# Patient Record
Sex: FEMALE | Race: WHITE | Employment: UNEMPLOYED | URBAN - METROPOLITAN AREA
[De-identification: names, ages, dates, MRNs, and addresses within clinical notes are randomized per-mention and may not be internally consistent; named-entity substitution may affect disease eponyms.]

---

## 2020-02-28 ENCOUNTER — HOSPITAL ENCOUNTER (OUTPATIENT)
Dept: GENERAL RADIOLOGY | Age: 65
Discharge: HOME | End: 2020-02-28
Payer: MEDICARE

## 2020-02-28 DIAGNOSIS — M19.90 DJD (DEGENERATIVE JOINT DISEASE): ICD-10-CM

## 2020-02-28 PROCEDURE — 73502 X-RAY EXAM HIP UNI 2-3 VIEWS: CPT

## 2020-09-01 ENCOUNTER — ANESTHESIA EVENT (OUTPATIENT)
Dept: ENDOSCOPY | Age: 65
End: 2020-09-01
Payer: MEDICARE

## 2020-09-01 ENCOUNTER — HOSPITAL ENCOUNTER (OUTPATIENT)
Dept: SURGERY | Age: 65
Discharge: HOME | End: 2020-09-01

## 2020-09-01 VITALS — WEIGHT: 189 LBS | HEIGHT: 63 IN | BODY MASS INDEX: 33.49 KG/M2

## 2020-09-01 RX ORDER — POTASSIUM CHLORIDE 600 MG/1
8 TABLET, FILM COATED, EXTENDED RELEASE ORAL 2 TIMES DAILY
COMMUNITY

## 2020-09-01 RX ORDER — UMECLIDINIUM 62.5 UG/1
1 AEROSOL, POWDER ORAL
COMMUNITY

## 2020-09-01 RX ORDER — ONDANSETRON HYDROCHLORIDE 8 MG/1
8 TABLET, FILM COATED ORAL
COMMUNITY

## 2020-09-01 RX ORDER — ALBUTEROL SULFATE 90 UG/1
AEROSOL, METERED RESPIRATORY (INHALATION) AS NEEDED
COMMUNITY

## 2020-09-01 RX ORDER — ZINC GLUCONATE 50 MG
1 TABLET ORAL EVERY EVENING
COMMUNITY

## 2020-09-01 RX ORDER — FUROSEMIDE 20 MG/1
20 TABLET ORAL DAILY
COMMUNITY

## 2020-09-01 RX ORDER — MONTELUKAST SODIUM 10 MG/1
10 TABLET ORAL EVERY EVENING
COMMUNITY

## 2020-09-01 RX ORDER — LISINOPRIL 10 MG/1
10 TABLET ORAL DAILY
COMMUNITY

## 2020-09-01 RX ORDER — ATORVASTATIN CALCIUM 20 MG/1
20 TABLET, FILM COATED ORAL EVERY EVENING
COMMUNITY

## 2020-09-01 RX ORDER — CARVEDILOL 6.25 MG/1
6.25 TABLET ORAL 2 TIMES DAILY WITH MEALS
COMMUNITY

## 2020-09-01 NOTE — PERIOP NOTES
Patient verified name, , and procedure. Type: 1a; abbreviated assessment per anesthesia guidelines  Labs per surgeon: none  Labs per anesthesia: none    A negative Covid swab result is required to proceed with surgery; appointments are made by the surgeon office and test should be collected 7 days prior to surgery. The testing center is located at the . [address], [place]. Instructed pt that they will be notified by the doctor office for time of arrival to GI lab. Pt states she just received call from Dr. Fausto Davalos office with all her instructions including med instructions. Pt frustrated that she needs to go though hx again but this RN informed pt current hx and med list in EMR not updated since  and current information needed for anesthesia day of procedure. Follow diet and prep instructions per office. Bath or shower the night before and the am of surgery with antibacterial soap. No lotions, oils, powders, cologne on skin. No make up, eye make up or jewelry. Wear loose fitting comfortable, clean clothing. Must have adult present in building the entire time . Medications for the day of procedure - coreg and bring ProAir inhaler to hospital.    The following discharge instructions reviewed with patient: medication given during procedure may cause drowsiness for several hours, therefore, do not drive or operate machinery for remainder of the day, no alcohol on the day of your procedure, resume regular diet and activity unless otherwise directed, for mild sore throat you may use Cepacol throat lozenges or warm salt water gargles as needed, call your physician for any problems or questions. Patient verbalizes understanding.

## 2020-09-02 ENCOUNTER — ANESTHESIA (OUTPATIENT)
Dept: ENDOSCOPY | Age: 65
End: 2020-09-02
Payer: MEDICARE

## 2020-09-02 ENCOUNTER — HOSPITAL ENCOUNTER (OUTPATIENT)
Age: 65
Setting detail: OUTPATIENT SURGERY
Discharge: HOME | End: 2020-09-02
Attending: INTERNAL MEDICINE | Admitting: INTERNAL MEDICINE
Payer: MEDICARE

## 2020-09-02 VITALS
SYSTOLIC BLOOD PRESSURE: 135 MMHG | RESPIRATION RATE: 14 BRPM | OXYGEN SATURATION: 97 % | DIASTOLIC BLOOD PRESSURE: 59 MMHG | TEMPERATURE: 98.6 F | BODY MASS INDEX: 33.49 KG/M2 | HEART RATE: 86 BPM | WEIGHT: 189 LBS | HEIGHT: 63 IN

## 2020-09-02 LAB — GLUCOSE BLD STRIP.AUTO-MCNC: 121 MG/DL (ref 65–100)

## 2020-09-02 PROCEDURE — 88312 SPECIAL STAINS GROUP 1: CPT

## 2020-09-02 PROCEDURE — 74011250636 HC RX REV CODE- 250/636: Performed by: NURSE ANESTHETIST, CERTIFIED REGISTERED

## 2020-09-02 PROCEDURE — 88305 TISSUE EXAM BY PATHOLOGIST: CPT

## 2020-09-02 PROCEDURE — 76040000025: Performed by: INTERNAL MEDICINE

## 2020-09-02 PROCEDURE — 82962 GLUCOSE BLOOD TEST: CPT

## 2020-09-02 PROCEDURE — 77030040687 HC BRSH RX CYTO ERCP USEN -C: Performed by: INTERNAL MEDICINE

## 2020-09-02 PROCEDURE — 76060000031 HC ANESTHESIA FIRST 0.5 HR: Performed by: INTERNAL MEDICINE

## 2020-09-02 PROCEDURE — 88104 CYTOPATH FL NONGYN SMEARS: CPT

## 2020-09-02 PROCEDURE — 87635 SARS-COV-2 COVID-19 AMP PRB: CPT

## 2020-09-02 PROCEDURE — 77030021593 HC FCPS BIOP ENDOSC BSC -A: Performed by: INTERNAL MEDICINE

## 2020-09-02 PROCEDURE — 74011250636 HC RX REV CODE- 250/636: Performed by: ANESTHESIOLOGY

## 2020-09-02 RX ORDER — PROPOFOL 10 MG/ML
INJECTION, EMULSION INTRAVENOUS AS NEEDED
Status: DISCONTINUED | OUTPATIENT
Start: 2020-09-02 | End: 2020-09-02 | Stop reason: HOSPADM

## 2020-09-02 RX ORDER — SODIUM CHLORIDE, SODIUM LACTATE, POTASSIUM CHLORIDE, CALCIUM CHLORIDE 600; 310; 30; 20 MG/100ML; MG/100ML; MG/100ML; MG/100ML
50 INJECTION, SOLUTION INTRAVENOUS CONTINUOUS
Status: DISCONTINUED | OUTPATIENT
Start: 2020-09-02 | End: 2020-09-02 | Stop reason: HOSPADM

## 2020-09-02 RX ADMIN — PROPOFOL 50 MG: 10 INJECTION, EMULSION INTRAVENOUS at 12:29

## 2020-09-02 RX ADMIN — SODIUM CHLORIDE, SODIUM LACTATE, POTASSIUM CHLORIDE, AND CALCIUM CHLORIDE: 600; 310; 30; 20 INJECTION, SOLUTION INTRAVENOUS at 11:45

## 2020-09-02 RX ADMIN — PROPOFOL 100 MG: 10 INJECTION, EMULSION INTRAVENOUS at 12:28

## 2020-09-02 RX ADMIN — PROPOFOL 100 MG: 10 INJECTION, EMULSION INTRAVENOUS at 12:30

## 2020-09-02 RX ADMIN — SODIUM CHLORIDE, SODIUM LACTATE, POTASSIUM CHLORIDE, AND CALCIUM CHLORIDE 50 ML/HR: 600; 310; 30; 20 INJECTION, SOLUTION INTRAVENOUS at 11:13

## 2020-09-02 NOTE — OP NOTES
Esophagogastroduodenoscopy    DATE of PROCEDURE: 9/2/2020    INDICATION:  Nausea/ Vomiting    POSTPROCEDURE DIAGNOSIS: Esophageal Candida, Gastritis    MEDICATIONS ADMINISTERED: MAC anesthesia (see anesthesia report)    INSTRUMENT: [id number]    PROCEDURE:  After obtaining informed consent, the patient was placed in the left lateral position and sedated. The endoscope was advanced under direct vision without difficulty. The esophagus, stomach (including retroflexed views) and duodenum were evaluated. The patient was taken to the recovery area in stable condition. FINDINGS:  ESOPHAGUS: Candida plaques, brushed  STOMACH: Mild gastritis, Biopsied  DUODENUM: normal    Estimated blood loss: 0-minimal   Specimens obtained during procedure: 1. Gastric, 2.  Esophageal brushings    PLAN:Follow pathology, Myceleally ruiz x 7d

## 2020-09-02 NOTE — H&P
[facility] H&P (Admission)        Date:  9/2/2020    Primary GI Physician: Sara    Chief Complaint: Nausea/ vomiting    Subjective:     History of Present Illness:  Patient is a 63 y.o. female with PMH of RA, TIA on Xerelto, who is being admitted for elective endoscopy. Last Soniya Mccray was 2 days ago    PMH:  Past Medical History:   Diagnosis Date    Arthritis     RA    Chronic kidney disease 2/2013    \"almost shut down due to methotrexate & celebrex\" (dc'd celebrex)    Coagulation disorder ([address]     Diabetes ([address] not since 2013    hx of - lost 100#  2/2010 to 2013- off med since 2/2013; A1C checked regularly - last A1c 6.? per pt    Factor V Leiden ([address]     GERD (gastroesophageal reflux disease)     Pb filter in place     placed 1986    Hx-TIA (transient ischemic attack) 1998    Hypertension     controlled with med    Ill-defined condition 2011    25-30% (B) carotid art blocked (pt does not know why she has started this)- has had recheck 2014- OK per PCP    Liver disease 2013    \"toxic from Methotrexate\"- did not take folic acid    Mild aortic stenosis echo 7/17/19    Left ventricle systolic function is normal. EF 60-65%.  Rheumatoid arthritis(714.0)     S/P cataract surgery     R 12/5/13 adn L 12/12/13 Acrysof lens implant    SOB (shortness of breath) on exertion     with 1 flight of steps- 40 yrs smoking- no dx of COPD    [facility]romboembol[facility] 1985    LLE \"from ankle to hip\"       PSH:  Past Surgical History:   Procedure Laterality Date    HX APPENDECTOMY      HX CHOLECYSTECTOMY      HX COLONOSCOPY      HX GYN      PRISCILLA/ BSO- LVH    HX HEENT      CE  R&L    HX OTHER SURGICAL      skin graft L hip    NEUROLOGICAL PROCEDURE UNLISTED  2009    L5 synovial cyst/ phong/screws- later Jan 2010 another lumbar    NEUROLOGICAL PROCEDURE UNLISTED      ZULMA X 7       Allergies:   Allergies   Allergen Reactions    Celebrex [Celecoxib] Other (comments)     Caused renal failure    Codeine Other (comments)     Severe HA    Penicillins Unknown (comments)     \"They overdosed me as a child\"  \"I almost \"       Home Medications:  Prior to Admission medications    Medication Sig Start Date End Date Taking? Authorizing Provider   carvediloL (COREG) 6.25 mg tablet Take 6.25 mg by mouth two (2) times daily (with meals). Take / use AM day of surgery  per anesthesia protocols. Yes Provider, Historical   potassium chloride SR (KLOR-CON 8) 8 mEq tablet Take 8 mEq by mouth two (2) times a day. Yes Provider, Historical   Magnesium Oxide 500 mg cap Take 1 Cap by mouth every evening. Yes Provider, Historical   furosemide (Lasix) 20 mg tablet Take 20 mg by mouth daily. Yes Provider, Historical   lisinopriL (PRINIVIL, ZESTRIL) 10 mg tablet Take 10 mg by mouth daily. Yes Provider, Historical   atorvastatin (LIPITOR) 20 mg tablet Take 20 mg by mouth every evening. Yes Provider, Historical   calcium carbonate/multivitamin (MULTIVITAMIN-CALCIUM CARB PO) Take  by mouth daily. Yes Provider, Historical   ondansetron hcl (Zofran) 8 mg tablet Take 8 mg by mouth every eight (8) hours as needed for Nausea or Vomiting. Yes Provider, Historical   umeclidinium (Incruse Ellipta) 62.5 mcg/actuation inhaler Take 1 Puff by inhalation nightly. Yes Provider, Historical   CALCIUM-VITAMIN D3 PO Take 1 Tab by mouth every evening. Yes Provider, Historical   montelukast (Singulair) 10 mg tablet Take 10 mg by mouth every evening. Yes Provider, Historical   folic acid (FOLVITE) 1 mg tablet Take 1 mg by mouth two (2) times a day. While on methotrexate for RA   Indications: inadequate folic acid   Yes Provider, Historical   methotrexate sodium 2.5 mg DsPk Take  by mouth. 6 tabs every  evening and 2 tabs every Monday evening  Indications: rheumatoid arthritis, pm   Yes Provider, Historical   omega-3 fatty acids-vitamin e (FISH OIL) 1,000 mg cap Take 1 Cap by mouth daily.    Yes Provider, Historical oxyCODONE-acetaminophen (PERCOCET 10)  mg per tablet Take 1 Tab by mouth every six (6) hours as needed for Pain. Can have 2 tabs TID prn   Indications: pain   Yes Provider, Historical   Omeprazole delayed release (PRILOSEC D/R) 20 mg tablet Take 20 mg by mouth nightly. Indications: HEARTBURN   Yes Provider, Historical   rivaroxaban (Xarelto) 20 mg tab tablet Take 20 mg by mouth every evening. Provider, Historical   albuterol (ProAir HFA) 90 mcg/actuation inhaler Take  by inhalation as needed for Wheezing. Provider, Historical       Hospital Medications:  Current Facility-Administered Medications   Medication Dose Route Frequency    lactated Ringers infusion  50 mL/hr IntraVENous CONTINUOUS       Social History:  Social History     Tobacco Use    Smoking status: Current Every Day Smoker     Packs/day: 2.00     Years: 40.00     Pack years: 80.00   Substance Use Topics    Alcohol use: No       Pt denies any history of drug use, tattoos, or blood transfusions. Family History:  Family History   Problem Relation Age of Onset    Hypertension Mother     Heart Disease Father         CABG/ hx several MI    Diabetes Father     Hypertension Father        Review of Systems:  A detailed 10 system ROS is obtained, with pertinent positives as listed above. All others are negative. Objective:     Physical Exam:  Vitals:  Visit Vitals  /78 (BP Patient Position: Sitting)   Pulse (!) 103   Temp 98.6 °F (37 °C)   Resp 16   Ht 5' 3\" (1.6 m)   Wt 85.7 kg (189 lb)   SpO2 91%   BMI 33.48 kg/m²     Gen:  Pt is alert, cooperative, no acute distress  Skin:  Extremities and face reveal no rashes. HEENT: Sclerae anicteric. Extra-occular muscles are intact. No oral ulcers. No abnormal pigmentation of the lips. The neck is supple. Cardiovascular: Regular rate and rhythm. No murmurs, gallops, or rubs. Respiratory:  Comfortable breathing with no accessory muscle use.  Clear breath sounds with no wheezes, rales, or rhonchi. GI:  Abdomen nondistended, soft, and nontender. Normal active bowel sounds. No enlargement of the liver or spleen. No masses palpable. Rectal:  Deferred  Musculoskeletal:  No pitting edema of the lower legs. Neurological:  Gross memory appears intact. Patient is alert and oriented. Psychiatric:  Mood appears appropriate with judgement intact. Lymphatic:  No cervical or supraclavicular adenopathy. Laboratory:    No results for input(s): WBC, HGB, HCT, PLT, MCV, NA, K, CL, CO2, BUN, CREA, CA, MG, GLU, AP, ALT, TBIL, TBILI, CBIL, ALB, TP, AML, LPSE, PTP, INR, APTT, HGBEXT, HCTEXT, PLTEXT, INREXT in the last 72 hours. No lab exists for component: SGOT, GPT, DBIL    Assessment:       Active Problems:    * No active hospital problems. *      Plan:       Nausea/ Vomiting-  Plan EGD.

## 2020-09-02 NOTE — ANESTHESIA POSTPROCEDURE EVALUATION
Procedure(s):  ESOPHAGOGASTRODUODENOSCOPY (EGD)/ BMI 34  ESOPHAGOGASTRODUODENAL (EGD) BIOPSY  ENDOSCOPIC BRUSHING.     total IV anesthesia    Anesthesia Post Evaluation      Multimodal analgesia: multimodal analgesia used between 6 hours prior to anesthesia start to PACU discharge  Patient location during evaluation: bedside  Patient participation: complete - patient participated  Level of consciousness: awake and responsive to light touch  Pain management: adequate  Airway patency: patent  Anesthetic complications: no  Cardiovascular status: acceptable, hemodynamically stable, blood pressure returned to baseline and stable  Respiratory status: acceptable, unassisted, spontaneous ventilation and nonlabored ventilation  Hydration status: acceptable  Post anesthesia nausea and vomiting:  controlled      INITIAL Post-op Vital signs:   Vitals Value Taken Time   /57 9/2/2020 12:47 PM   Temp     Pulse 86 9/2/2020 12:47 PM   Resp 16 9/2/2020 12:47 PM   SpO2 96 % 9/2/2020 12:47 PM

## 2020-09-02 NOTE — DISCHARGE INSTRUCTIONS
Patient Education        Upper GI Endoscopy: What to Expect at [address] had an upper GI endoscopy. Your doctor used a thin, lighted tube that bends to look at the inside of your esophagus, your stomach, and the first part of the small intestine, called the duodenum. After you have an endoscopy, you will stay at the hospital or clinic for 1 to 2 hours. This will allow the medicine to wear off. You will be able to go home after your doctor or nurse checks to make sure that you're not having any problems. You may have to stay overnight if you had treatment during the test. You may have a sore throat for a day or two after the test.  This care sheet gives you a general idea about what to expect after the test.  How can you care for yourself at home? Activity   · Rest as much as you need to after you go home. · You should be able to go back to your usual activities the day after the test.  Diet   · Follow your doctor's directions for eating after the test.  · Drink plenty of fluids (unless your doctor has told you not to). Medications   · If you have a sore throat the day after the test, use an over-the-counter spray to numb your throat. Follow-up care is a key part of your treatment and safety. Be sure to make and go to all appointments, and call your doctor if you are having problems. It's also a good idea to know your test results and keep a list of the medicines you take. When should you call for help? [id number] anytime you think you may need emergency care. For example, call if:  · You passed out (loses consciousness). · You have trouble breathing. · You pass maroon or bloody stools. Call your doctor now or seek immediate medical care if:  · You have pain that does not get better after your take pain medicine. · You have new or worse belly pain. · You have blood in your stools. · You are sick to your stomach and cannot keep fluids down. · You have a fever.   · You cannot pass stools or gas.  Watch closely for changes in your health, and be sure to contact your doctor if:  · Your throat still hurts after a day or two. · You do not get better as expected. Where can you learn more? Go to http://www.harvey.com/  Enter J454 in the search box to learn more about \"Upper GI Endoscopy: What to Expect at Home. \"  Current as of: August 12, 2019               Content Version: 12.5  © 7015-6255 [employer]. Care instructions adapted under license by [employer] (which disclaims liability or warranty for this information). If you have questions about a medical condition or this instruction, always ask your healthcare professional. [address] any warranty or liability for your use of this information.

## 2020-09-02 NOTE — ANESTHESIA PREPROCEDURE EVALUATION
Anesthetic History               Review of Systems / Medical History  Patient summary reviewed and pertinent labs reviewed    Pulmonary    COPD: mild      Smoker         Neuro/Psych         TIA     Cardiovascular    Hypertension              Exercise tolerance: >4 METS  Comments: Denies any knowledge or symptoms of Mild AS. Denies CP, SOB, Palpitations, Syncope   GI/Hepatic/Renal     GERD: well controlled    Renal disease (NSAID toxicity 2/2013, resolved)  PUD (Dry Heaving)     Endo/Other    Diabetes (no meds, diet controlled)         Other Findings   Comments: Factor V Leiden - lawanda filter in place         Physical Exam    Airway  Mallampati: II  TM Distance: > 6 cm  Neck ROM: normal range of motion   Mouth opening: Normal     Cardiovascular    Rhythm: regular  Rate: normal    Murmur: Grade 2, Aortic area     Dental    Dentition: Caps/crowns and Bridges     Pulmonary  Breath sounds clear to auscultation               Abdominal         Other Findings            Anesthetic Plan    ASA: 3  Anesthesia type: total IV anesthesia            Anesthetic plan and risks discussed with: Patient      Dry Heaving x3 months.  Episodes this am with no vomitus present

## 2020-09-03 LAB
COVID-19 RAPID TEST, COVR: NOT DETECTED
SARS COV-2, XPGCVT: NEGATIVE
SOURCE, COVRS: NORMAL

## (undated) DEVICE — FORCEPS BX L240CM JAW DIA2.8MM L CAP W/ NDL MIC MESH TOOTH

## (undated) DEVICE — KENDALL RADIOLUCENT FOAM MONITORING ELECTRODE RECTANGULAR SHAPE: Brand: KENDALL

## (undated) DEVICE — BLOCK BITE AD 60FR W/ VELC STRP ADDRESSES MOST PT AND

## (undated) DEVICE — CANNULA NSL ORAL AD FOR CAPNOFLEX CO2 O2 AIRLFE

## (undated) DEVICE — CONTAINER PREFIL FRMLN 40ML --

## (undated) DEVICE — Device

## (undated) DEVICE — CONNECTOR TBNG OD5-7MM O2 END DISP